# Patient Record
Sex: MALE | Race: WHITE | NOT HISPANIC OR LATINO | Employment: UNEMPLOYED | URBAN - METROPOLITAN AREA
[De-identification: names, ages, dates, MRNs, and addresses within clinical notes are randomized per-mention and may not be internally consistent; named-entity substitution may affect disease eponyms.]

---

## 2019-01-19 ENCOUNTER — CLINICAL SUPPORT (OUTPATIENT)
Dept: URGENT CARE | Facility: CLINIC | Age: 54
End: 2019-01-19
Payer: MEDICAID

## 2019-01-19 VITALS
RESPIRATION RATE: 12 BRPM | WEIGHT: 315 LBS | BODY MASS INDEX: 40.83 KG/M2 | SYSTOLIC BLOOD PRESSURE: 131 MMHG | HEART RATE: 87 BPM | DIASTOLIC BLOOD PRESSURE: 84 MMHG | TEMPERATURE: 98 F | OXYGEN SATURATION: 98 %

## 2019-01-19 DIAGNOSIS — S91.331A PUNCTURE WOUND OF RIGHT FOOT, INITIAL ENCOUNTER: Primary | ICD-10-CM

## 2019-01-19 DIAGNOSIS — L03.115 CELLULITIS OF RIGHT LOWER EXTREMITY: ICD-10-CM

## 2019-01-19 PROCEDURE — 90715 TDAP VACCINE 7 YRS/> IM: CPT | Mod: SL,S$GLB,, | Performed by: NURSE PRACTITIONER

## 2019-01-19 PROCEDURE — 90471 TDAP VACCINE GREATER THAN OR EQUAL TO 7YO IM: ICD-10-PCS | Mod: S$GLB,VFC,, | Performed by: NURSE PRACTITIONER

## 2019-01-19 PROCEDURE — 99204 PR OFFICE/OUTPT VISIT, NEW, LEVL IV, 45-59 MIN: ICD-10-PCS | Mod: 25,S$GLB,, | Performed by: NURSE PRACTITIONER

## 2019-01-19 PROCEDURE — 90471 IMMUNIZATION ADMIN: CPT | Mod: S$GLB,VFC,, | Performed by: NURSE PRACTITIONER

## 2019-01-19 PROCEDURE — 73630 X-RAY EXAM OF FOOT: CPT | Mod: RT,S$GLB,, | Performed by: NURSE PRACTITIONER

## 2019-01-19 PROCEDURE — 99204 OFFICE O/P NEW MOD 45 MIN: CPT | Mod: 25,S$GLB,, | Performed by: NURSE PRACTITIONER

## 2019-01-19 PROCEDURE — 73630 PR  X-RAY FOOT 3+ VW: ICD-10-PCS | Mod: RT,S$GLB,, | Performed by: NURSE PRACTITIONER

## 2019-01-19 PROCEDURE — 90715 TDAP VACCINE GREATER THAN OR EQUAL TO 7YO IM: ICD-10-PCS | Mod: SL,S$GLB,, | Performed by: NURSE PRACTITIONER

## 2019-01-19 RX ORDER — CLINDAMYCIN HYDROCHLORIDE 300 MG/1
300 CAPSULE ORAL EVERY 8 HOURS
Qty: 30 CAPSULE | Refills: 0 | Status: SHIPPED | OUTPATIENT
Start: 2019-01-19 | End: 2019-01-29

## 2019-01-19 RX ORDER — CIPROFLOXACIN 500 MG/1
500 TABLET ORAL 2 TIMES DAILY
Qty: 14 TABLET | Refills: 0 | Status: SHIPPED | OUTPATIENT
Start: 2019-01-19 | End: 2019-01-26

## 2019-01-19 NOTE — PATIENT INSTRUCTIONS
Discharge Instructions for Cellulitis  You have been diagnosed with cellulitis. This is an infection in the deepest layer of the skin. In some cases, the infection also affects the muscle. Cellulitis is caused by bacteria. The bacteria can enter the body through broken skin. This can happen with a cut, scratch, animal bite, or an insect bite that has been scratched. You may have been treated in the hospital with antibiotics and fluids. You will likely be given a prescription for antibiotics to take at home. This sheet will help you take care of yourself at home.  Home care  When you are home:  · Take the prescribed antibiotic medicine you are given as directed until it is gone. Take it even if you feel better. It treats the infection and stops it from returning. Not taking all the medicine can make future infections hard to treat.  · Keep the infected area clean.  · When possible, raise the infected area above the level of your heart. This helps keep swelling down.  · Talk with your healthcare provider if you are in pain. Ask what kind of over-the-counter medicine you can take for pain.  · Apply clean bandages as advised.  · Take your temperature once a day for a week.  · Wash your hands often to prevent spreading the infection.  In the future, wash your hands before and after you touch cuts, scratches, or bandages. This will help prevent infection.   When to call your healthcare provider  Call your healthcare provider immediately if you have any of the following:  · Difficulty or pain when moving the joints above or below the infected area  · Discharge or pus draining from the area  · Fever of 100.4°F (38°C) or higher, or as directed by your healthcare provider  · Pain that gets worse in or around the infected   · Redness that gets worse in or around the infected area, particularly if the area of redness expands to a wider area  · Shaking chills  · Swelling of the infected area  · Vomiting   Date Last Reviewed:  8/1/2016 © 2000-2017 [employer]. [address] [place] [address], [place] [address]. All rights reserved. This information is not intended as a substitute for professional medical care. Always follow your healthcare professional's instructions.        Puncture Wound: Foot       A puncture wound occurs when a pointed object (such as a nail) pushes into the skin. It may go into the tissues below the skin of the foot, including fat and muscle. This type of wound is narrow and deep. They can be hard to clean. Puncture wounds are at high risk for becoming infected. One type of serious infection is more likely if you were wearing a rubber-soled shoe at the time of injury. Bacteria from the sole of the shoe may be dragged into the wound. Symptoms of infection may appear as late as 2 to 3 weeks after the injury. Be sure to watch for symptoms of infection and call your healthcare provider right away if any them appear.  X-rays may be done to see whether any objects remain under the skin. Your may also need a tetanus shot. This is given if you are not up to date on this vaccination and the object that caused the wound may lead to tetanus.  Puncture wounds can easily become infected.   Home care  · When you sit or lie down, raise the foot above the level of your heart. This helps reduce swelling and pain.  · Do not put weight on the injured foot if it hurts to do so or if you were told to keep weight off the injury.  · Your healthcare provider may prescribe an antibiotic. This is to help prevent infection. Follow all instructions for taking this medicine. Take the medicine every day until it is gone or you are told to stop. You should not have any left over.  · The healthcare provider may prescribe medicines for pain. Follow instructions for taking them.  · You can take acetaminophen or ibuprofen for pain, unless you were given a different pain medicine to use.   · Follow the healthcare providers instructions on how to  care for the wound.  · Keep the wound clean and dry. Do not get the wound wet until you are told it is okay to do so. If the area gets wet, gently pat it dry with a clean cloth. Replace the wet bandage with a dry one.  · If a bandage was applied and it becomes wet or dirty, replace it. Otherwise, leave it in place for the first 24 hours.  · Once you can get the wound wet, you may shower as usual but do not soak the wound in water (no tub baths or swimming)  · Check the wound daily for symptoms of infection. These include:  ¨ Increasing redness or swelling around the wound  ¨ Increased warmth of the wound  ¨ Worsening pain  ¨ Red streaking lines away from the wound  ¨ Draining pus  Follow-up care  Follow up with your healthcare provider as advised.   When to seek medical advice  Call your healthcare provider right away if any of these occur:  · Any symptoms of infection (listed above)  · Fever of 100.4°F (38.ºC) or higher, or as directed by your healthcare provider  · Wound changes colors  · Numbness around the wound  · Decreased movement around the injured area  Date Last Reviewed: 8/24/2015  © 0652-3773 [employer]. [address] [place] [address], [place] [address]. All rights reserved. This information is not intended as a substitute for professional medical care. Always follow your healthcare professional's instructions.

## 2019-01-19 NOTE — PROGRESS NOTES
Subjective:       Patient ID: Hansel Wade is a 53 y.o. male.    Vitals:  weight is 144.2 kg (318 lb) (abnormal). His temperature is 97.6 °F (36.4 °C). His blood pressure is 131/84 and his pulse is 87. His respiration is 12 and oxygen saturation is 98%.     Chief Complaint: Foot Injury    Hansel Wade is a 53 year old male presenting to clinic requesting a tetanus shot. The patient stepped on a nail 2 days ago while wearing shoes and pulled the entire nail out. He states he does not believe there is any retained foreign body in the foot. He noticed slight redness to the foot but has had no fever or drainage. He has neuropathy to the foot but has had no worsening pain.       Foot Injury    The incident occurred 12 to 24 hours ago. There was no injury mechanism. The pain is present in the right foot. The pain is at a severity of 5/10. The pain is mild. He reports no foreign bodies present. The symptoms are aggravated by movement. He has tried nothing for the symptoms.       Constitution: Negative for chills, fatigue and fever.   HENT: Negative for congestion and sore throat.    Neck: Negative for painful lymph nodes.   Cardiovascular: Negative for chest pain and leg swelling.   Eyes: Negative for double vision and blurred vision.   Respiratory: Negative for cough and shortness of breath.    Gastrointestinal: Negative for nausea, vomiting and diarrhea.   Genitourinary: Negative for dysuria, frequency and urgency.   Musculoskeletal: Negative for joint pain, joint swelling, muscle cramps and muscle ache.   Skin: Positive for wound (puncture wound to bottom of right foot). Negative for color change, pale and rash.   Allergic/Immunologic: Negative for seasonal allergies.   Neurological: Negative for dizziness, history of vertigo, light-headedness, passing out and headaches.   Hematologic/Lymphatic: Negative for swollen lymph nodes, easy bruising/bleeding and history of blood clots. Does not bruise/bleed easily.    Psychiatric/Behavioral: Negative for nervous/anxious, sleep disturbance and depression. The patient is not nervous/anxious.        Objective:      Physical Exam   Constitutional: He is oriented to person, place, and time. He appears well-developed and well-nourished. He is cooperative.  Non-toxic appearance. He does not appear ill. No distress.   HENT:   Head: Normocephalic and atraumatic.   Right Ear: Hearing, tympanic membrane, external ear and ear canal normal.   Left Ear: Hearing, tympanic membrane, external ear and ear canal normal.   Nose: Nose normal. No mucosal edema, rhinorrhea or nasal deformity. No epistaxis. Right sinus exhibits no maxillary sinus tenderness and no frontal sinus tenderness. Left sinus exhibits no maxillary sinus tenderness and no frontal sinus tenderness.   Mouth/Throat: Uvula is midline, oropharynx is clear and moist and mucous membranes are normal. No trismus in the jaw. Normal dentition. No uvula swelling. No posterior oropharyngeal erythema.   Eyes: Conjunctivae and lids are normal. Right eye exhibits no discharge. Left eye exhibits no discharge. No scleral icterus.   Sclera clear bilat   Neck: Trachea normal, normal range of motion, full passive range of motion without pain and phonation normal. Neck supple.   Cardiovascular: Normal rate, regular rhythm, normal heart sounds, intact distal pulses and normal pulses.   Pulmonary/Chest: Effort normal and breath sounds normal. No respiratory distress.   Abdominal: Soft. Normal appearance and bowel sounds are normal. He exhibits no distension, no pulsatile midline mass and no mass. There is no tenderness.   Musculoskeletal: Normal range of motion. He exhibits no edema or deformity.   Neurological: He is alert and oriented to person, place, and time. He exhibits normal muscle tone. Coordination normal.   Skin: Skin is warm, dry and intact. He is not diaphoretic. No pallor.   Puncture wound to plantar surface of right foot below the 4th  toe. Surrounding erythema to the top half plantar surface of the foot and some erythema to the dorsum of the base of the 1-4 metatarsals. No drainage from the wound. Slight swelling   Psychiatric: He has a normal mood and affect. His speech is normal and behavior is normal. Judgment and thought content normal. Cognition and memory are normal.   Nursing note and vitals reviewed.      Assessment:       1. Puncture wound of right foot, initial encounter    2. Cellulitis of right lower extremity        Plan:       Patient is a diabetic presenting with puncture wound from nail to the bottom of the right foot. There is some surrounding erythema which suggests cellulitis. Xray independently interpreted by me with no foreign body or evidence of osteomyelitis. Patient given tetanus injection and will be treated with clindamycin and cipro due to puncture wound. Erythema does not track up the lower extremity at this time and I do not think emergent ER transfer is necessary at this time. Patient instructed to follow up with PCP in 2 days for recheck. Go to ER for worsening erythema or no improvement, fever, new symptoms. Based on my clinical evaluation, I do not appreciate any immediate, emergent, or life threatening condition or etiology that warrants additional workup today and feel that the patient can be discharged with close follow up care.     Puncture wound of right foot, initial encounter  -     X-Ray Foot Complete 3 view Right; Future; Expected date: 01/19/2019    Cellulitis of right lower extremity    Other orders  -     (In Office Administered) Tdap Vaccine  -     clindamycin (CLEOCIN) 300 MG capsule; Take 1 capsule (300 mg total) by mouth every 8 (eight) hours. for 10 days  Dispense: 30 capsule; Refill: 0  -     ciprofloxacin HCl (CIPRO) 500 MG tablet; Take 1 tablet (500 mg total) by mouth 2 (two) times daily. for 7 days  Dispense: 14 tablet; Refill: 0

## 2019-01-19 NOTE — LETTER
January 19, 2019      [facility]  [address]  [place] [address]  Phone: [phone]       Patient: Hansel Wade   YOB: 1965  Date of Visit: 01/19/2019    To Whom It May Concern:    Marily Wade  was at [facility] on 01/19/2019. He may return to work/school on 1-22-19 with no restrictions. If you have any questions or concerns, or if I can be of further assistance, please do not hesitate to contact me.    Sincerely,    Rowena George NP

## 2019-01-22 ENCOUNTER — CLINICAL SUPPORT (OUTPATIENT)
Dept: PODIATRY | Facility: CLINIC | Age: 54
End: 2019-01-22
Payer: MEDICAID

## 2019-01-22 VITALS
TEMPERATURE: 100 F | HEIGHT: 74 IN | HEART RATE: 87 BPM | BODY MASS INDEX: 40.3 KG/M2 | DIASTOLIC BLOOD PRESSURE: 84 MMHG | SYSTOLIC BLOOD PRESSURE: 131 MMHG | WEIGHT: 314 LBS

## 2019-01-22 DIAGNOSIS — S91.331A PUNCTURE WOUND OF PLANTAR ASPECT OF RIGHT FOOT, INITIAL ENCOUNTER: Primary | ICD-10-CM

## 2019-01-22 DIAGNOSIS — L02.611 CUTANEOUS ABSCESS OF RIGHT FOOT: ICD-10-CM

## 2019-01-22 PROCEDURE — 99203 PR OFFICE/OUTPT VISIT, NEW, LEVL III, 30-44 MIN: ICD-10-PCS | Mod: 25,,, | Performed by: PODIATRIST

## 2019-01-22 PROCEDURE — 99203 OFFICE O/P NEW LOW 30 MIN: CPT | Mod: 25,,, | Performed by: PODIATRIST

## 2019-01-22 RX ORDER — AMLODIPINE AND BENAZEPRIL HYDROCHLORIDE 5; 20 MG/1; MG/1
CAPSULE ORAL
COMMUNITY

## 2019-01-22 NOTE — PROGRESS NOTES
[address] [facility] [address]  [place] [address]  Phone: [phone]   [phone]    Patient's PCP:Anirudh Whitten III, MD  Referring Provider: No ref. provider found    Subjective:      Chief Complaint:: Foot Injury (Right 4th MPJ)    HPI  Hansel Wade is a 53 y.o. male who presents with a complaint of puncture wound on right 4th mpj 5 days ago. Onset of the symptoms was stepped on a nail. Current symptoms include redness,swelling,pain.   Symptoms have increased.Treatment to date have included Urgent care,xrays,antibiotics. Patients rates pain 7/10 on pain scale.    Vitals:    01/22/19 1602   BP: 131/84   Pulse: 87   Temp: 99.6 °F (37.6 °C)     Shoe Size: 15    Past Surgical History:   Procedure Laterality Date    LUMBAR ASPIRATION AND DRAINAGE      staph infection    LUMBAR DISCECTOMY      L3   surgery 3 seperate times, had staff for 5 months    SPINE SURGERY      L5 disectomy     Past Medical History:   Diagnosis Date    Basal cell carcinoma 10/27/2016    Right chest    Diabetes mellitus type II     Encounter for blood transfusion     pt is Jehovah Witness    Hypertension     Neuropathy in diabetes     Otitis media      Family History   Problem Relation Age of Onset    Diabetes Mother     Kidney disease Mother     COPD Father     Cancer Father         leg melanoma left    Heart disease Father         heary  blockage  chf    Stroke Father     Diabetes Paternal Grandfather     Melanoma Neg Hx     Psoriasis Neg Hx     Lupus Neg Hx     Eczema Neg Hx         Social History:   Marital Status:   Alcohol History:  reports that he does not drink alcohol.  Tobacco History:  reports that  has never smoked. he has never used smokeless tobacco.  Drug History:  reports that he does not use drugs.    Review of patient's allergies indicates:  No Known Allergies    Current Outpatient Medications   Medication Sig Dispense Refill    amlodipine-benazepril 5-20 mg (LOTREL) 5-20 mg per capsule  amlodipine 5 mg-benazepril 20 mg capsule      clindamycin (CLEOCIN) 300 MG capsule Take 1 capsule (300 mg total) by mouth every 8 (eight) hours. for 10 days 30 capsule 0    gabapentin (NEURONTIN) 300 MG capsule Take 300 mg by mouth every evening.        lisinopril-hydrochlorothiazide (PRINZIDE,ZESTORETIC) 20-12.5 mg per tablet Take 1 tablet by mouth once daily.       metformin (GLUCOPHAGE) 1000 MG tablet       glimepiride (AMARYL) 4 MG tablet       saxagliptin-metformin (KOMBIGLYZE XR) 2.5-1,000 mg TM24 Take by mouth 2 (two) times daily.         No current facility-administered medications for this visit.        Review of Systems   Constitutional: Positive for chills, fatigue and fever. Negative for unexpected weight change.   HENT: Positive for hearing loss. Negative for trouble swallowing.    Eyes: Negative for photophobia and visual disturbance.   Respiratory: Negative for cough, shortness of breath and wheezing.    Cardiovascular: Negative for chest pain, palpitations and leg swelling.   Gastrointestinal: Negative for abdominal pain and nausea.   Genitourinary: Positive for frequency. Negative for dysuria.   Musculoskeletal: Negative for arthralgias, back pain and joint swelling.   Skin: Positive for wound. Negative for rash.   Neurological: Negative for tremors, seizures, weakness, numbness and headaches.   Hematological: Does not bruise/bleed easily.         Objective:        Physical Exam:   General    Constitutional: He appears well-developed and well-nourished.     General Musculoskeletal Exam   Gait: antalgic     Right Ankle/Foot Exam     Inspection   Erythema: present (Plantar of the third and fourth metatarsophalangeal joints by puncture wound. Erythema located on the dorsal surfaces of third and fourth MPJ to midfoot.)    Range of Motion   Ankle Joint   Dorsiflexion:  5 abnormal     Muscle Strength   The patient has normal right ankle strength.    Other   Sensation: decreased    Comments:  The  plantar aspect of the right foot approximately third metatarsophalangeal joint has a puncture wound. The puncture wound probes down at least 1.5 cm. There was a small area of purulent drainage that I culture today.    Left Ankle/Foot Exam     Range of Motion   Ankle Joint  Dorsiflexion:  5 abnormal     Muscle Strength   The patient has normal left ankle strength.    Other   Sensation: decreased      Vascular Exam     Right Pulses  Dorsalis Pedis:      1+  Posterior Tibial:      2+        Left Pulses  Dorsalis Pedis:      1+  Posterior Tibial:      1+          Physical Exam   Constitutional: He appears well-developed and well-nourished.   Cardiovascular:   Pulses:       Dorsalis pedis pulses are 1+ on the right side, and 1+ on the left side.        Posterior tibial pulses are 2+ on the right side, and 1+ on the left side.   Musculoskeletal:        Feet:    Feet:   Right Foot:   Protective Sensation: 4 sites tested. 4 sites sensed.   Left Foot:   Protective Sensation: 4 sites tested. 4 sites sensed.       Imaging:   X-Ray Chest 1 View  CLINICAL HISTORY:  53 years (1965) Male CHEST PAIN, UNSPECIFIED    TECHNIQUE:  Portable AP radiograph the chest.    COMPARISON:  None available.    FINDINGS:  The lungs are clear. Costophrenic angles are seen without effusion. No  pneumothorax is identified. The heart is normal in size. The mediastinum is  within normal limits. Osseous structures show degenerative changes in the spine.  The visualized upper abdomen is unremarkable.    IMPRESSION:  No acute cardiac or pulmonary process.    .    Read and electronically signed by: Amber Madden MD on 1/24/2019 4:18 PM CST    AMBER MADDEN MD  MRI Lower Extremity Without Contrast Right  HISTORY: 53-year-old male with a history of redness and swelling over the  dorsal aspect of the right foot. Patient stepped on a nail one week ago.  Markers were placed over this area.    TECHNIQUE: Axial, coronal and sagittal images of the RIGHT  foot were obtained  on a 1.5 Sugar magnet.    CONTRAST: None.    COMPARISON: Radiographs from July 11, 2014.    FINDINGS:  NOTE: udy is moderately limited given the lack of IV contrast, especially for  evaluation of infection/inflammation. Note that imaging plays a very limited in  the diagnosis and management of necrotizing fasciitis as no imaging modality  can reliably exclude underlying necrotizing fasciitis, and in the absence of  soft tissue gas a negative study should not preclude obtaining a tissue biopsy,  in addition cases with a very high clinical suspicion should not delay surgical  intervention    Bones (other than subarticular marrow): No acute fracture or dislocation. No  gross focal abnormal marrow signal intensity is seen to specifically suggest  osteomyelitis.  Muscles: Moderate subcutaneous soft tissue edema is seen in the quadratus  musculature suggesting either myositis or reactive edema.  Soft tissues: There is moderate subcutaneous soft tissue swelling and edema  around the midfoot with skin thickening suggestive of cellulitis.    Hallux:  Osseous: Normal.  Extensor tendon: Normal.  First metatarsophalangeal joint: Normal.  Hallux-sesamoid complex: Normal.    Second ray:  Osseous: Normal.  Extensor tendon: Normal.  Flexor tendon: Normal.  Second metatarsophalangeal joint: Normal.    Third ray:  Osseous: Normal.  Extensor tendon: Normal.  Flexor tendon: Normal.  Third metatarsophalangeal joint: Normal.    Fourth ray:  Soft tissues: There is focal fluid along the dorsal aspect of the fourth  metatarsophalangeal joint covering an area of approximately 2.5 x 1.5 cm. There  is edema in the interdigital space between the third and fourth metatarsal  heads. There are rounded areas of low signal intensity on T1 and T2 images  suggestive of gas and organizing phlegmon/abscess (noting that doesn't  fasciitis is not excluded)  Osseous: No osseous erosion or bone marrow signal intensity is seen  the  specifically suggest osteomyelitis.  Extensor tendon: Mild edema seen in the tendon and tendon sheath suggestive of  tenosynovitis.  Flexor tendon: Normal.  Fourth metatarsophalangeal joint: Small joint effusion is seen) and septic  arthritis is not excluded)    Fifth ray:  Osseous: Normal.  Extensor tendon: Normal.  Flexor tendon: Normal.  Fifth metatarsophalangeal joint: Normal.    IMPRESSION:  1. Moderate edema around the dorsal mid foot in keeping with cellulitis.  2. Focal fat stranding edema swelling and likely gas or debris in the  interdigital space between the third and fifth metatarsal head concerning for  phlegmon/abscess with gas an debris (noting necrotizing fasciitis is not  excluded).  3. Fluid along the dorsal joint line of the fourth metatarsophalangeal joint  suggesting a joint effusion and septic arthritis.  4. Inflammation and edema in the flexor quadratus musculature of the midfoot.    RESULT NOTIFICATION: These observations were discussed by the dictating  physician by phone with, and acknowledged by KEN KOENIG MD, at  1/24/2019 11:50 AM CST.    Read and electronically signed by: Amber Patel MD on 1/24/2019 11:55 AM CST    AMBER PATEL MD         Assessment:       1. Puncture wound of plantar aspect of right foot, initial encounter    2. Cutaneous abscess of right foot      Plan:   Puncture wound of plantar aspect of right foot, initial encounter  -     Culture, Anaerobic  -     Aerobic culture  -     Fungus culture  -     MRI Foot (Midfoot) Right Without Contrast; Future; Expected date: 01/22/2019            Follow-up for Test Results.    -     Counseling:     I provided patient education verbally regarding:   Patient diagnosis, treatment options, as well as alternatives, risks, and benefits.     This note was created using Dragon voice recognition software that occasionally misinterpreted phrases or words.

## 2019-01-24 ENCOUNTER — HISTORICAL (OUTPATIENT)
Dept: ADMINISTRATIVE | Facility: HOSPITAL | Age: 54
End: 2019-01-24

## 2019-01-24 ENCOUNTER — TELEPHONE (OUTPATIENT)
Dept: PODIATRY | Facility: CLINIC | Age: 54
End: 2019-01-24

## 2019-01-24 LAB
ALBUMIN SERPL-MCNC: 3.4 G/DL (ref 3.1–4.7)
ALP SERPL-CCNC: 63 IU/L (ref 40–104)
ALT (SGPT): 15 IU/L (ref 3–33)
APTT PPP: 34 SEC (ref 26.2–34.7)
AST SERPL-CCNC: 17 IU/L (ref 10–40)
BASOPHILS NFR BLD: 0 K/UL (ref 0–0.2)
BASOPHILS NFR BLD: 0.5 %
BILIRUB SERPL-MCNC: 0.7 MG/DL (ref 0.3–1)
BUN SERPL-MCNC: 21 MG/DL (ref 8–20)
CALCIUM SERPL-MCNC: 9.1 MG/DL (ref 7.7–10.4)
CHLORIDE: 101 MMOL/L (ref 98–110)
CO2 SERPL-SCNC: 26.6 MMOL/L (ref 22.8–31.6)
CREATININE: 1.05 MG/DL (ref 0.6–1.4)
CRP SERPL-MCNC: 7.2 MG/DL (ref 0–1.4)
EOSINOPHIL NFR BLD: 0.1 K/UL (ref 0–0.7)
EOSINOPHIL NFR BLD: 1.6 %
ERYTHROCYTE [DISTWIDTH] IN BLOOD BY AUTOMATED COUNT: 12.9 % (ref 11.7–14.9)
GLUCOSE: 113 MG/DL (ref 70–99)
GRAN #: 6 K/UL (ref 1.4–6.5)
GRAN%: 70.2 %
HCT VFR BLD AUTO: 44.9 % (ref 39–55)
HGB BLD-MCNC: 15.2 G/DL (ref 14–16)
IMMATURE GRANS (ABS): 0 K/UL (ref 0–1)
IMMATURE GRANULOCYTES: 0.5 %
INR PPP: 1
LYMPH #: 1.4 K/UL (ref 1.2–3.4)
LYMPH%: 17 %
MCH RBC QN AUTO: 27.3 PG (ref 25–35)
MCHC RBC AUTO-ENTMCNC: 33.9 G/DL (ref 31–36)
MCV RBC AUTO: 80.8 FL (ref 80–100)
MONO #: 0.9 K/UL (ref 0.1–0.6)
MONO%: 10.2 %
NUCLEATED RBCS: 0 %
PLATELET # BLD AUTO: 252 K/UL (ref 140–440)
PMV BLD AUTO: 10.2 FL (ref 8.8–12.7)
POTASSIUM SERPL-SCNC: 4.2 MMOL/L (ref 3.5–5)
PROT SERPL-MCNC: 7.2 G/DL (ref 6–8.2)
PROTHROMBIN TIME: 13 SEC (ref 11.7–14)
RBC # BLD AUTO: 5.56 M/UL (ref 4.3–5.9)
SODIUM: 135 MMOL/L (ref 134–144)
WBC # BLD AUTO: 8.5 K/UL (ref 5–10)

## 2019-01-25 LAB — HBA1C MFR BLD: 11 % (ref 3.1–6.5)

## 2019-01-27 LAB
BUN SERPL-MCNC: 26 MG/DL (ref 8–20)
CREATININE: 3.02 MG/DL (ref 0.6–1.4)
CRP SERPL-MCNC: 7.56 MG/DL (ref 0–1.4)

## 2019-01-28 PROCEDURE — 97597 DBRDMT OPN WND 1ST 20 CM/<: CPT | Mod: ,,, | Performed by: PODIATRIST

## 2019-01-28 PROCEDURE — 97597 WOUND DEBRIDEMENT: ICD-10-PCS | Mod: ,,, | Performed by: PODIATRIST

## 2019-01-28 NOTE — PROCEDURES
"Wound Debridement  Date/Time: 1/28/2019 3:18 PM  Performed by: Matthew Carballo DPM  Authorized by: Matthew Carballo DPM     Time out: Immediately prior to procedure a "time out" was called to verify the correct patient, procedure, equipment, support staff and site/side marked as required.    Consent Done?:  Yes (Verbal)    Preparation: Patient was prepped and draped in usual sterile fashion    Local anesthesia used?: No      Wound Details:    Location:  Right foot    Location:  Right 4th Metatarsal Head    Type of Debridement:  Non-excisional       Length (cm):  0.2       Area (sq cm):  0.04       Width (cm):  0.2       Percent Debrided (%):  100       Depth (cm):  2.5       Total Area Debrided (sq cm):  0.04    Depth of debridement:  Epidermis/Dermis    Tissue debrided:  Dermis and Epidermis    Instruments:  Blade    Bleeding:  Minimal  Hemostasis Achieved: Yes    Method Used:  Pressure  Patient tolerance:  Patient tolerated the procedure well with no immediate complications      "

## 2019-01-29 ENCOUNTER — TELEPHONE (OUTPATIENT)
Dept: PODIATRY | Facility: CLINIC | Age: 54
End: 2019-01-29

## 2019-02-01 ENCOUNTER — TELEPHONE (OUTPATIENT)
Dept: PODIATRY | Facility: CLINIC | Age: 54
End: 2019-02-01

## 2019-02-01 DIAGNOSIS — S91.331D PUNCTURE WOUND OF PLANTAR ASPECT OF RIGHT FOOT, SUBSEQUENT ENCOUNTER: Primary | ICD-10-CM

## 2019-02-04 ENCOUNTER — OFFICE VISIT (OUTPATIENT)
Dept: PODIATRY | Facility: CLINIC | Age: 54
End: 2019-02-04
Payer: MEDICAID

## 2019-02-04 VITALS
BODY MASS INDEX: 39.27 KG/M2 | SYSTOLIC BLOOD PRESSURE: 159 MMHG | HEIGHT: 74 IN | TEMPERATURE: 98 F | WEIGHT: 306 LBS | DIASTOLIC BLOOD PRESSURE: 98 MMHG | HEART RATE: 92 BPM

## 2019-02-04 DIAGNOSIS — E11.41 DIABETIC MONONEUROPATHY ASSOCIATED WITH TYPE 2 DIABETES MELLITUS: ICD-10-CM

## 2019-02-04 DIAGNOSIS — S90.851D FOREIGN BODY IN RIGHT FOOT WITH INFECTION, SUBSEQUENT ENCOUNTER: Primary | ICD-10-CM

## 2019-02-04 DIAGNOSIS — L08.9 FOREIGN BODY IN RIGHT FOOT WITH INFECTION, SUBSEQUENT ENCOUNTER: Primary | ICD-10-CM

## 2019-02-04 PROBLEM — S90.851A FOREIGN BODY IN RIGHT FOOT WITH INFECTION: Status: ACTIVE | Noted: 2019-02-04

## 2019-02-04 PROCEDURE — 99024 POSTOP FOLLOW-UP VISIT: CPT | Mod: ,,, | Performed by: PODIATRIST

## 2019-02-04 PROCEDURE — 99024 PR POST-OP FOLLOW-UP VISIT: ICD-10-PCS | Mod: ,,, | Performed by: PODIATRIST

## 2019-02-04 RX ORDER — DOXYCYCLINE 100 MG/1
100 CAPSULE ORAL 2 TIMES DAILY
Refills: 0 | COMMUNITY
Start: 2019-01-29

## 2019-02-04 RX ORDER — LEVOFLOXACIN 500 MG/1
500 TABLET, FILM COATED ORAL DAILY
Refills: 0 | COMMUNITY
Start: 2019-01-29

## 2019-02-04 NOTE — PROGRESS NOTES
[address] [facility] [address] [place] [address]  Phone: [phone]   [phone]    Patient's PCP:Anirudh Whitten III, MD  Referring Provider:No ref. provider found    Subjective:      Chief Complaint: Post-Op    Date of Surgery: 1/25/19 1 week and 3 days ago  Procedure: Incision and drainage of abscess right 4th mpj     HPI:   Hansel Wade is a 53 y.o. male who returns to the clinic today for his 1st postop visit.  Hansel Wade rates pain a 0/10 on a pain scale. Compliance of Care: Bandaids,Antibiotics.    Vitals:    02/04/19 1117   BP: (!) 159/98   Pulse: 92   Temp: 98.2 °F (36.8 °C)       Past Surgical History:   Procedure Laterality Date    FOOT SURGERY Right 01/25/2019    Infected wound right 4th mpj    LUMBAR ASPIRATION AND DRAINAGE      staph infection    LUMBAR DISCECTOMY      L3   surgery 3 seperate times, had staff for 5 months    SPINE SURGERY      L5 disectomy     Past Medical History:   Diagnosis Date    Basal cell carcinoma 10/27/2016    Right chest    Diabetes mellitus type II     Encounter for blood transfusion     pt is Jehovah Witness    Hypertension     Neuropathy in diabetes     Otitis media      Family History   Problem Relation Age of Onset    Diabetes Mother     Kidney disease Mother     COPD Father     Cancer Father         leg melanoma left    Heart disease Father         heary  blockage  chf    Stroke Father     Diabetes Paternal Grandfather     Melanoma Neg Hx     Psoriasis Neg Hx     Lupus Neg Hx     Eczema Neg Hx         Social History:   Marital Status:   Alcohol History:  reports that he does not drink alcohol.  Tobacco History:  reports that  has never smoked. he has never used smokeless tobacco.  Drug History:  reports that he does not use drugs.    Review of patient's allergies indicates:  No Known Allergies    Current Outpatient Medications   Medication Sig Dispense Refill    amlodipine-benazepril 5-20 mg (LOTREL) 5-20 mg per capsule  amlodipine 5 mg-benazepril 20 mg capsule      doxycycline (VIBRAMYCIN) 100 MG Cap Take 100 mg by mouth 2 (two) times daily.  0    gabapentin (NEURONTIN) 300 MG capsule Take 300 mg by mouth every evening.        glimepiride (AMARYL) 4 MG tablet       levoFLOXacin (LEVAQUIN) 500 MG tablet Take 500 mg by mouth once daily.  0    lisinopril-hydrochlorothiazide (PRINZIDE,ZESTORETIC) 20-12.5 mg per tablet Take 1 tablet by mouth once daily.       metformin (GLUCOPHAGE) 1000 MG tablet       saxagliptin-metformin (KOMBIGLYZE XR) 2.5-1,000 mg TM24 Take by mouth 2 (two) times daily.         No current facility-administered medications for this visit.        Review of Systems   Constitutional: Negative for chills, fatigue, fever and unexpected weight change.   HENT: Negative for hearing loss and trouble swallowing.    Eyes: Negative for photophobia and visual disturbance.   Respiratory: Negative for cough, shortness of breath and wheezing.    Cardiovascular: Negative for chest pain, palpitations and leg swelling.   Gastrointestinal: Negative for abdominal pain and nausea.   Genitourinary: Negative for dysuria and frequency.   Musculoskeletal: Negative for arthralgias, back pain and joint swelling.   Skin: Positive for wound. Negative for rash.   Neurological: Negative for tremors, seizures, weakness, numbness and headaches.   Hematological: Does not bruise/bleed easily.         Objective:        Post-op surgery of the incision and drainage of abscess right foot with normal healing, no signs of infection or dehiscence of wound. Hardware in place. Normal post op exam today. No redness, no drainage, no increase in local temperature, no significant swelling, sutures.steri-strips are intact.     Imaging:     Physical Exam:   Foot Exam  Physical Exam       Assessment:       1. Foreign body in right foot with infection, subsequent encounter    2. Diabetic mononeuropathy associated with type 2 diabetes mellitus      Plan:    Foreign body in right foot with infection, subsequent encounter    Diabetic mononeuropathy associated with type 2 diabetes mellitus      Follow-up in about 1 week (around 2/11/2019).    Procedures - None    The surgical dressing was removed showing no signs of infection, excess edema or malalignment. A new dry dressing was applied and patient was instructed to leave dressing intact until next visit or until instructed to remove. Continue surgical shoe and return to see me in 1 week for suture removal. Continue local wound care.    This note was created using Dragon voice recognition software that occasionally misinterpreted phrases or words.

## 2019-02-11 ENCOUNTER — OFFICE VISIT (OUTPATIENT)
Dept: PODIATRY | Facility: CLINIC | Age: 54
End: 2019-02-11
Payer: MEDICAID

## 2019-02-11 VITALS
SYSTOLIC BLOOD PRESSURE: 138 MMHG | HEART RATE: 78 BPM | WEIGHT: 306 LBS | TEMPERATURE: 98 F | BODY MASS INDEX: 39.27 KG/M2 | DIASTOLIC BLOOD PRESSURE: 87 MMHG | HEIGHT: 74 IN

## 2019-02-11 DIAGNOSIS — L08.9 FOREIGN BODY IN RIGHT FOOT WITH INFECTION, SUBSEQUENT ENCOUNTER: ICD-10-CM

## 2019-02-11 DIAGNOSIS — S90.851D FOREIGN BODY IN RIGHT FOOT WITH INFECTION, SUBSEQUENT ENCOUNTER: ICD-10-CM

## 2019-02-11 DIAGNOSIS — E11.41 DIABETIC MONONEUROPATHY ASSOCIATED WITH TYPE 2 DIABETES MELLITUS: ICD-10-CM

## 2019-02-11 DIAGNOSIS — S91.331D PUNCTURE WOUND OF PLANTAR ASPECT OF RIGHT FOOT, SUBSEQUENT ENCOUNTER: Primary | ICD-10-CM

## 2019-02-11 PROCEDURE — 99024 POSTOP FOLLOW-UP VISIT: CPT | Mod: ,,, | Performed by: PODIATRIST

## 2019-02-11 PROCEDURE — 99024 PR POST-OP FOLLOW-UP VISIT: ICD-10-PCS | Mod: ,,, | Performed by: PODIATRIST

## 2019-02-11 RX ORDER — INSULIN GLARGINE 100 [IU]/ML
INJECTION, SOLUTION SUBCUTANEOUS
Refills: 1 | COMMUNITY
Start: 2019-02-08

## 2019-02-11 NOTE — PROGRESS NOTES
[address] [facility] [address]  [place] [address]  Phone: [phone]   [phone]    Patient's PCP:Anirudh Whitten III, MD  Referring Provider: No ref. provider found    Subjective:      Chief Complaint:: Post-op Evaluation (Right 4th MPJ)    HPI  Hansel Wade is a 53 y.o. male who presents with a complaint of  *** lasting for ***. Onset of the symptoms was ***.  Current symptoms include ***.  Aggravating factors are ***. Symptoms have ***.Treatment to date have included *** Patients rates pain ***/*** on pain scale.    Systemic Doctor: ***  Date Last Seen: ***  Blood Sugar:   Hemoglobin A1c:     Vitals:    02/11/19 0950   BP: 138/87   Pulse: 78   Temp: 98.2 °F (36.8 °C)     Shoe Size:     Past Surgical History:   Procedure Laterality Date    FOOT SURGERY Right 01/25/2019    Infected wound right 4th mpj    LUMBAR ASPIRATION AND DRAINAGE      staph infection    LUMBAR DISCECTOMY      L3   surgery 3 seperate times, had staff for 5 months    SPINE SURGERY      L5 disectomy     Past Medical History:   Diagnosis Date    Basal cell carcinoma 10/27/2016    Right chest    Diabetes mellitus type II     Encounter for blood transfusion     pt is Jehovah Witness    Hypertension     Neuropathy in diabetes     Otitis media      Family History   Problem Relation Age of Onset    Diabetes Mother     Kidney disease Mother     COPD Father     Cancer Father         leg melanoma left    Heart disease Father         heary  blockage  chf    Stroke Father     Diabetes Paternal Grandfather     Melanoma Neg Hx     Psoriasis Neg Hx     Lupus Neg Hx     Eczema Neg Hx         Social History:   Marital Status:   Alcohol History:  reports that he does not drink alcohol.  Tobacco History:  reports that  has never smoked. he has never used smokeless tobacco.  Drug History:  reports that he does not use drugs.    Review of patient's allergies indicates:  No Known Allergies    Current Outpatient Medications    Medication Sig Dispense Refill    amlodipine-benazepril 5-20 mg (LOTREL) 5-20 mg per capsule amlodipine 5 mg-benazepril 20 mg capsule      BASAGLAR KWIKPEN U-100 INSULIN glargine 100 units/mL (3mL) SubQ pen INJECT 15 UNIT(S) EVERY DAY BY SUBCUTANEOUS ROUTE AT BEDTIME  1    doxycycline (VIBRAMYCIN) 100 MG Cap Take 100 mg by mouth 2 (two) times daily.  0    gabapentin (NEURONTIN) 300 MG capsule Take 300 mg by mouth every evening.        levoFLOXacin (LEVAQUIN) 500 MG tablet Take 500 mg by mouth once daily.  0    glimepiride (AMARYL) 4 MG tablet       lisinopril-hydrochlorothiazide (PRINZIDE,ZESTORETIC) 20-12.5 mg per tablet Take 1 tablet by mouth once daily.       metformin (GLUCOPHAGE) 1000 MG tablet       saxagliptin-metformin (KOMBIGLYZE XR) 2.5-1,000 mg TM24 Take by mouth 2 (two) times daily.         No current facility-administered medications for this visit.        Review of Systems   Constitutional: Negative for chills, fatigue, fever and unexpected weight change.   HENT: Negative for hearing loss and trouble swallowing.    Eyes: Negative for photophobia and visual disturbance.   Respiratory: Negative for cough, shortness of breath and wheezing.    Cardiovascular: Negative for chest pain, palpitations and leg swelling.   Gastrointestinal: Negative for abdominal pain and nausea.   Genitourinary: Negative for dysuria and frequency.   Musculoskeletal: Negative for arthralgias, back pain and joint swelling.   Skin: Positive for wound. Negative for rash.   Neurological: Positive for numbness. Negative for tremors, seizures, weakness and headaches.   Hematological: Does not bruise/bleed easily.         Objective:        Physical Exam:   Foot Exam  Physical Exam    Imaging:            Assessment:       No diagnosis found.  Plan:   There are no diagnoses linked to this encounter.  No Follow-up on file.    Procedures - None    Counseling:     I provided patient education verbally regarding:   Patient  diagnosis, treatment options, as well as alternatives, risks, and benefits.     This note was created using Dragon voice recognition software that occasionally misinterpreted phrases or words.             [unfilled]

## 2019-02-11 NOTE — PROGRESS NOTES
[address] [facility] [address] [place] [address]  Phone: [phone]   [phone]    Patient's PCP:Anirudh Whitten III, MD  Referring Provider:No ref. provider found    Subjective:      Chief Complaint: Post-Op    Date of Surgery: 1/25/19  Procedure:  Incision and drainage of abscess right 4th mpj          HPI:   Hansel Wade is a 53 y.o. male who returns to the clinic today for his 3rd postop visit.  Hansel Wade rates pain a 1/10 on a pain scale. Compliance of Care: Bandaid,Antibiotics.    Vitals:    02/11/19 0950   BP: 138/87   Pulse: 78   Temp: 98.2 °F (36.8 °C)       Past Surgical History:   Procedure Laterality Date    FOOT SURGERY Right 01/25/2019    Infected wound right 4th mpj    LUMBAR ASPIRATION AND DRAINAGE      staph infection    LUMBAR DISCECTOMY      L3   surgery 3 seperate times, had staff for 5 months    SPINE SURGERY      L5 disectomy     Past Medical History:   Diagnosis Date    Basal cell carcinoma 10/27/2016    Right chest    Diabetes mellitus type II     Encounter for blood transfusion     pt is Jehovah Witness    Hypertension     Neuropathy in diabetes     Otitis media      Family History   Problem Relation Age of Onset    Diabetes Mother     Kidney disease Mother     COPD Father     Cancer Father         leg melanoma left    Heart disease Father         heary  blockage  chf    Stroke Father     Diabetes Paternal Grandfather     Melanoma Neg Hx     Psoriasis Neg Hx     Lupus Neg Hx     Eczema Neg Hx         Social History:   Marital Status:   Alcohol History:  reports that he does not drink alcohol.  Tobacco History:  reports that  has never smoked. he has never used smokeless tobacco.  Drug History:  reports that he does not use drugs.    Review of patient's allergies indicates:  No Known Allergies    Current Outpatient Medications   Medication Sig Dispense Refill    amlodipine-benazepril 5-20 mg (LOTREL) 5-20 mg per capsule amlodipine 5  mg-benazepril 20 mg capsule      BASAGLAR KWIKPEN U-100 INSULIN glargine 100 units/mL (3mL) SubQ pen INJECT 15 UNIT(S) EVERY DAY BY SUBCUTANEOUS ROUTE AT BEDTIME  1    doxycycline (VIBRAMYCIN) 100 MG Cap Take 100 mg by mouth 2 (two) times daily.  0    gabapentin (NEURONTIN) 300 MG capsule Take 300 mg by mouth every evening.        levoFLOXacin (LEVAQUIN) 500 MG tablet Take 500 mg by mouth once daily.  0    glimepiride (AMARYL) 4 MG tablet       lisinopril-hydrochlorothiazide (PRINZIDE,ZESTORETIC) 20-12.5 mg per tablet Take 1 tablet by mouth once daily.       metformin (GLUCOPHAGE) 1000 MG tablet       saxagliptin-metformin (KOMBIGLYZE XR) 2.5-1,000 mg TM24 Take by mouth 2 (two) times daily.         No current facility-administered medications for this visit.        Review of Systems      Objective:        Post-op surgery of the incision and drainage of abscess right third intermetatarsal space and fourth toe secondary to foreign body with normal healing, no signs of infection or dehiscence of wound. Hardware in place. Normal post op exam today. No redness, no drainage, no increase in local temperature, no significant swelling, sutures.steri-strips are intact.     Imaging:     Physical Exam:   Foot Exam  Physical Exam       Assessment:       1. Puncture wound of plantar aspect of right foot, subsequent encounter    2. Foreign body in right foot with infection, subsequent encounter    3. Diabetic mononeuropathy associated with type 2 diabetes mellitus      Plan:   Puncture wound of plantar aspect of right foot, subsequent encounter    Foreign body in right foot with infection, subsequent encounter    Diabetic mononeuropathy associated with type 2 diabetes mellitus      No Follow-up on file.  Sutures were removed from the right foot today, patient will continue surgical shoe, finish his oral antibiotics per infectious disease. Return to see me in 3 weeks or as needed.  Procedures - None    The surgical dressing  was removed showing no signs of infection, excess edema or malalignment. A new dry dressing was applied and patient was instructed to leave dressing intact until next visit or until instructed to remove.     This note was created using Dragon voice recognition software that occasionally misinterpreted phrases or words.

## 2020-07-31 ENCOUNTER — LAB VISIT (OUTPATIENT)
Dept: FAMILY MEDICINE | Facility: CLINIC | Age: 55
End: 2020-07-31
Payer: MEDICAID

## 2020-07-31 DIAGNOSIS — Z01.810 PRE-OPERATIVE CARDIOVASCULAR EXAMINATION: ICD-10-CM

## 2020-07-31 PROCEDURE — U0003 INFECTIOUS AGENT DETECTION BY NUCLEIC ACID (DNA OR RNA); SEVERE ACUTE RESPIRATORY SYNDROME CORONAVIRUS 2 (SARS-COV-2) (CORONAVIRUS DISEASE [COVID-19]), AMPLIFIED PROBE TECHNIQUE, MAKING USE OF HIGH THROUGHPUT TECHNOLOGIES AS DESCRIBED BY CMS-2020-01-R: HCPCS

## 2020-08-01 LAB — SARS-COV-2 RNA RESP QL NAA+PROBE: NOT DETECTED

## 2020-08-03 PROBLEM — N50.9: Status: ACTIVE | Noted: 2020-08-03

## 2020-08-03 PROBLEM — N50.89 MASS OF EPIDIDYMIS: Status: ACTIVE | Noted: 2020-08-03

## 2021-05-10 ENCOUNTER — PATIENT MESSAGE (OUTPATIENT)
Dept: RESEARCH | Facility: HOSPITAL | Age: 56
End: 2021-05-10

## 2024-02-26 ENCOUNTER — TELEPHONE (OUTPATIENT)
Dept: OPHTHALMOLOGY | Facility: CLINIC | Age: 59
End: 2024-02-26
Payer: MEDICAID

## 2024-02-26 NOTE — TELEPHONE ENCOUNTER
Spoke to pt and offered to schedule next avail cat eval.     Pt declines states cannot wait til Ruby